# Patient Record
Sex: MALE | Race: WHITE | NOT HISPANIC OR LATINO | Employment: OTHER | URBAN - METROPOLITAN AREA
[De-identification: names, ages, dates, MRNs, and addresses within clinical notes are randomized per-mention and may not be internally consistent; named-entity substitution may affect disease eponyms.]

---

## 2018-03-07 ENCOUNTER — TRANSFERRED RECORDS (OUTPATIENT)
Dept: HEALTH INFORMATION MANAGEMENT | Facility: CLINIC | Age: 78
End: 2018-03-07

## 2020-10-20 ENCOUNTER — OFFICE VISIT (OUTPATIENT)
Dept: NEUROLOGY | Facility: CLINIC | Age: 80
End: 2020-10-20
Payer: COMMERCIAL

## 2020-10-20 VITALS
BODY MASS INDEX: 25.01 KG/M2 | HEART RATE: 66 BPM | DIASTOLIC BLOOD PRESSURE: 81 MMHG | HEIGHT: 68 IN | WEIGHT: 165 LBS | SYSTOLIC BLOOD PRESSURE: 134 MMHG

## 2020-10-20 DIAGNOSIS — G89.29 CHRONIC LEFT-SIDED LOW BACK PAIN WITH LEFT-SIDED SCIATICA: Primary | ICD-10-CM

## 2020-10-20 DIAGNOSIS — M54.42 CHRONIC LEFT-SIDED LOW BACK PAIN WITH LEFT-SIDED SCIATICA: Primary | ICD-10-CM

## 2020-10-20 DIAGNOSIS — R20.2 PARESTHESIAS IN RIGHT HAND: ICD-10-CM

## 2020-10-20 PROCEDURE — 99204 OFFICE O/P NEW MOD 45 MIN: CPT | Performed by: PSYCHIATRY & NEUROLOGY

## 2020-10-20 RX ORDER — ACETAMINOPHEN 500 MG
500-1000 TABLET ORAL EVERY 6 HOURS PRN
COMMUNITY

## 2020-10-20 RX ORDER — IBUPROFEN 200 MG
200 TABLET ORAL EVERY 4 HOURS PRN
COMMUNITY

## 2020-10-20 SDOH — HEALTH STABILITY: MENTAL HEALTH: HOW OFTEN DO YOU HAVE 6 OR MORE DRINKS ON ONE OCCASION?: NOT ASKED

## 2020-10-20 SDOH — HEALTH STABILITY: MENTAL HEALTH: HOW MANY STANDARD DRINKS CONTAINING ALCOHOL DO YOU HAVE ON A TYPICAL DAY?: NOT ASKED

## 2020-10-20 SDOH — HEALTH STABILITY: MENTAL HEALTH: HOW OFTEN DO YOU HAVE A DRINK CONTAINING ALCOHOL?: 4 OR MORE TIMES A WEEK

## 2020-10-20 ASSESSMENT — MIFFLIN-ST. JEOR: SCORE: 1422.94

## 2020-10-20 NOTE — LETTER
10/20/2020         RE: Rayray Avery  [address] [facility] [address]        Dear Colleague,    Thank you for referring your patient, Rayray Avery, to the [facility] [place]. Please see a copy of my visit note below.        NEUROLOGY NOTE        Assessment/Plan        Right upper extremity paresthesia for about 2 years mainly supine position.  Examination unremarkable.    Left lower back chronic pain radiating to the lateral side of the thigh down to about the knee level.  Severe degree 10 out of 10 with initial ambulation.  Does get little better after 1 miles walking and will come back again.  Supine and sitting position are fine.  There are some focal tenderness in the piriformis area.  Range of motion adequate.  Failed injection and physical therapy.  Has been going on for 2 years.  Last injection September 2020 no benefit.    Plan:  MRI of lumbar spine  EMG of right upper and left lower extremity.  Family will get previous MRI and the clinic notes to me for review.         SUBJECTIVE       Rayray Avery is a 80 year old male seen for chronic back pain..      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  Severe pain 10 out of 10 with initiation of's standing or walking.  it gets down to 2-3 in degree if he continues to walk for more than a mile.  Then the pain may come back again.  Sitting down or laying seem to be fine.  No incontinence.  No numbness or weakness in lower extremities.    Taking Tylenol and ibuprofen intermittently.  The pain is every days and every time he stands up and walking.  Had several injections.  One of the injection gave him 1 months of symptom relief.  Last injection was September without benefit.  Has had physical therapy did not benefit.    MRI study in December 2019.  Was told to have some degenerative changes.    Also  "had x-ray of the hip as well as the spine.  Report is not available for me to review.    Right hand numbness if he is laying on the back.  No weakness.  It has been going on for about 1 to 2 years.    No other known injuries.             Review of system     10 point system review otherwise unremarkable    PHYSICAL EXAMINATION     Vital signs in last 24 hours:  Vitals:    10/20/20 1112   BP: 134/81   Pulse: 66   Weight: 74.8 kg (165 lb)   Height: 1.727 m (5' 8\")       No acute distress sitting in chair.  Very pleasant.  Well-dressed and groomed.  Atraumatic normocephalic.  Range of motion in the arms and legs normal.  No pain with range of motion.  Exam of the head, neck, eyes, ears, nose and mouth negative.  No edema or skin rashes.  No JVD and breathing difficulty.  Normal mental status, language and speech.  Cranial nerve II through XII intact.  Adequate muscle bulk, tone and strength in 4 extremities.  No focal sensory difficulty.  Deep tendon reflexes 2+ symmetrical in the ankles and knees.  No Babinski signs.  Hand coordination adequate.  Little slow limping gait on the left.  Slightly stooped body posture.  No tremors.  No bradycardia or hypokinesia.      Problem List     Chronic lower back pain    Past medical history     Chronic lower back pain      Family history     Family History   Problem Relation Age of Onset     Cancer Mother          Social history     Social History     Socioeconomic History     Marital status:      Spouse name: Not on file     Number of children: Not on file     Years of education: Not on file     Highest education level: Not on file   Occupational History     Not on file   Social Needs     Financial resource strain: Not on file     Food insecurity     Worry: Not on file     Inability: Not on file     Transportation needs     Medical: Not on file     Non-medical: Not on file   Tobacco Use     Smoking status: Former Smoker     Smokeless tobacco: Never Used   Substance and " Sexual Activity     Alcohol use: Yes     Alcohol/week: 1.0 standard drinks     Types: 1 Glasses of wine per week     Frequency: 4 or more times a week     Drug use: Not on file     Sexual activity: Not on file   Lifestyle     Physical activity     Days per week: Not on file     Minutes per session: Not on file     Stress: Not on file   Relationships     Social connections     Talks on phone: Not on file     Gets together: Not on file     Attends [employer] service: Not on file     Active member of club or organization: Not on file     Attends meetings of clubs or organizations: Not on file     Relationship status: Not on file     Intimate partner violence     Fear of current or ex partner: Not on file     Emotionally abused: Not on file     Physically abused: Not on file     Forced sexual activity: Not on file   Other Topics Concern     Parent/sibling w/ CABG, MI or angioplasty before 65F 55M? Not Asked   Social History Narrative     Not on file         Allergy     Patient has no known allergies.    MEDICATIONS List     Current Outpatient Medications   Medication Sig Dispense Refill     acetaminophen (TYLENOL) 500 MG tablet Take 500-1,000 mg by mouth every 6 hours as needed for mild pain       ibuprofen (ADVIL/MOTRIN) 200 MG tablet Take 200 mg by mouth every 4 hours as needed for mild pain                     Ching Frederick MD, MD, PhD  Neurology   Office tel: [phone]          Again, thank you for allowing me to participate in the care of your patient.        Sincerely,        Ching Frederick MD

## 2020-10-20 NOTE — PROGRESS NOTES
NEUROLOGY NOTE        Assessment/Plan        Right upper extremity paresthesia for about 2 years mainly supine position.  Examination unremarkable.    Left lower back chronic pain radiating to the lateral side of the thigh down to about the knee level.  Severe degree 10 out of 10 with initial ambulation.  Does get little better after 1 miles walking and will come back again.  Supine and sitting position are fine.  There are some focal tenderness in the piriformis area.  Range of motion adequate.  Failed injection and physical therapy.  Has been going on for 2 years.  Last injection September 2020 no benefit.    Plan:  MRI of lumbar spine  EMG of right upper and left lower extremity.  Family will get previous MRI and the clinic notes to me for review.         SUBJECTIVE       Rayray Avery is a 80 year old male seen for chronic back pain..      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  Severe pain 10 out of 10 with initiation of's standing or walking.  it gets down to 2-3 in degree if he continues to walk for more than a mile.  Then the pain may come back again.  Sitting down or laying seem to be fine.  No incontinence.  No numbness or weakness in lower extremities.    Taking Tylenol and ibuprofen intermittently.  The pain is every days and every time he stands up and walking.  Had several injections.  One of the injection gave him 1 months of symptom relief.  Last injection was September without benefit.  Has had physical therapy did not benefit.    MRI study in December 2019.  Was told to have some degenerative changes.    Also had x-ray of the hip as well as the spine.  Report is not available for me to review.    Right hand numbness if he is laying on the back.  No weakness.  It has been going on for about 1 to 2 years.    No other known injuries.             Review of system     10 point  "system review otherwise unremarkable    PHYSICAL EXAMINATION     Vital signs in last 24 hours:  Vitals:    10/20/20 1112   BP: 134/81   Pulse: 66   Weight: 74.8 kg (165 lb)   Height: 1.727 m (5' 8\")       No acute distress sitting in chair.  Very pleasant.  Well-dressed and groomed.  Atraumatic normocephalic.  Range of motion in the arms and legs normal.  No pain with range of motion.  Exam of the head, neck, eyes, ears, nose and mouth negative.  No edema or skin rashes.  No JVD and breathing difficulty.  Normal mental status, language and speech.  Cranial nerve II through XII intact.  Adequate muscle bulk, tone and strength in 4 extremities.  No focal sensory difficulty.  Deep tendon reflexes 2+ symmetrical in the ankles and knees.  No Babinski signs.  Hand coordination adequate.  Little slow limping gait on the left.  Slightly stooped body posture.  No tremors.  No bradycardia or hypokinesia.      Problem List     Chronic lower back pain    Past medical history     Chronic lower back pain      Family history     Family History   Problem Relation Age of Onset     Cancer Mother          Social history     Social History     Socioeconomic History     Marital status:      Spouse name: Not on file     Number of children: Not on file     Years of education: Not on file     Highest education level: Not on file   Occupational History     Not on file   Social Needs     Financial resource strain: Not on file     Food insecurity     Worry: Not on file     Inability: Not on file     Transportation needs     Medical: Not on file     Non-medical: Not on file   Tobacco Use     Smoking status: Former Smoker     Smokeless tobacco: Never Used   Substance and Sexual Activity     Alcohol use: Yes     Alcohol/week: 1.0 standard drinks     Types: 1 Glasses of wine per week     Frequency: 4 or more times a week     Drug use: Not on file     Sexual activity: Not on file   Lifestyle     Physical activity     Days per week: Not on " file     Minutes per session: Not on file     Stress: Not on file   Relationships     Social connections     Talks on phone: Not on file     Gets together: Not on file     Attends Oriental orthodox service: Not on file     Active member of club or organization: Not on file     Attends meetings of clubs or organizations: Not on file     Relationship status: Not on file     Intimate partner violence     Fear of current or ex partner: Not on file     Emotionally abused: Not on file     Physically abused: Not on file     Forced sexual activity: Not on file   Other Topics Concern     Parent/sibling w/ CABG, MI or angioplasty before 65F 55M? Not Asked   Social History Narrative     Not on file         Allergy     Patient has no known allergies.    MEDICATIONS List     Current Outpatient Medications   Medication Sig Dispense Refill     acetaminophen (TYLENOL) 500 MG tablet Take 500-1,000 mg by mouth every 6 hours as needed for mild pain       ibuprofen (ADVIL/MOTRIN) 200 MG tablet Take 200 mg by mouth every 4 hours as needed for mild pain                     Ching Frederick MD, MD, PhD  Neurology   Office tel: [phone]

## 2020-10-22 ENCOUNTER — OFFICE VISIT (OUTPATIENT)
Dept: NEUROLOGY | Facility: CLINIC | Age: 80
End: 2020-10-22
Attending: PSYCHIATRY & NEUROLOGY
Payer: COMMERCIAL

## 2020-10-22 VITALS
BODY MASS INDEX: 25.01 KG/M2 | HEIGHT: 68 IN | DIASTOLIC BLOOD PRESSURE: 70 MMHG | HEART RATE: 64 BPM | WEIGHT: 165 LBS | SYSTOLIC BLOOD PRESSURE: 133 MMHG

## 2020-10-22 DIAGNOSIS — R20.2 PARESTHESIAS IN RIGHT HAND: ICD-10-CM

## 2020-10-22 DIAGNOSIS — M54.42 CHRONIC LEFT-SIDED LOW BACK PAIN WITH LEFT-SIDED SCIATICA: ICD-10-CM

## 2020-10-22 DIAGNOSIS — H90.0 CONDUCTIVE HEARING LOSS, BILATERAL: ICD-10-CM

## 2020-10-22 DIAGNOSIS — G60.3 IDIOPATHIC PROGRESSIVE NEUROPATHY: Primary | ICD-10-CM

## 2020-10-22 DIAGNOSIS — R41.3 MEMORY DIFFICULTY: ICD-10-CM

## 2020-10-22 DIAGNOSIS — G89.29 CHRONIC LEFT-SIDED LOW BACK PAIN WITH LEFT-SIDED SCIATICA: ICD-10-CM

## 2020-10-22 PROCEDURE — 95913 NRV CNDJ TEST 13/> STUDIES: CPT | Performed by: PSYCHIATRY & NEUROLOGY

## 2020-10-22 PROCEDURE — 95886 MUSC TEST DONE W/N TEST COMP: CPT | Performed by: PSYCHIATRY & NEUROLOGY

## 2020-10-22 PROCEDURE — 99203 OFFICE O/P NEW LOW 30 MIN: CPT | Performed by: PSYCHIATRY & NEUROLOGY

## 2020-10-22 ASSESSMENT — MIFFLIN-ST. JEOR: SCORE: 1422.94

## 2020-10-22 NOTE — PROGRESS NOTES
NEUROLOGY NOTE        Assessment/Plan        Right upper extremity paresthesia for about 2 years mainly supine position.  Examination unremarkable.     Left lower back chronic pain radiating to the lateral side of the thigh down to about the knee level.  Severe degree 10 out of 10 with initial ambulation.  Does get little better after 1 miles walking and will come back again.  Supine and sitting position are fine.  There are some focal tenderness in the piriformis area.  Range of motion adequate.  Failed injection and physical therapy.  Has been going on for 2 years.  Last injection September 2020 no benefit.     Sensorimotor polyneuropathy of axonal type.  Confirmed on EMG EMG study 10/22/2020.  This may contribute to the walking difficulty on top of hard of hearing.    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  No hearing aids.    Some memory difficulty noted during visit today.    Plan:    MRI of lumbar spine pending    Family will get previous MRI and the clinic notes to me for review.  Have requested patient to sign release of information.    Follow-up in about 3 weeks to discuss test results for memory test.    Blood tests including B12, TSH, vitamin B1, serum immunofixation, CBC, BMP, and liver function    Plan memory test at next visit             SUBJECTIVE         Rayray Avery is a 80 year old male seen for chronic back pain..       Report about 2 years ago he had a fellow told him that he had polyneuropathy.  At that time he he may have had EMG study.  But he does not remember where he had the test done and the which Dr. Vasquez saw.  Did not know if he had any work-up.  Reports for the past 3 years also he has not seen any doctors.  But he did see a primary as last time discussion we had per his wife.  He had MRI of lower back but at this time we do not have the report yet.    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  Severe pain 10 out of 10 with initiation of's standing or walking.  it gets down to 2-3 in degree if he continues to walk for more than a mile.  Then the pain may come back again.  Sitting down or laying seem to be fine.  No incontinence.  No numbness or weakness in lower extremities.     Taking Tylenol and ibuprofen intermittently.  The pain is every days and every time he stands up and walking.  Had several injections.  One of the injection gave him 1 months of symptom relief.  Last injection was September without benefit.  Has had physical therapy did not benefit.     MRI study in December 2019.  Was told to have some degenerative changes.     Also had x-ray of the hip as well as the spine.  Report is not available for me to review.     Right hand numbness if he is laying on the back.  No weakness.  It has been going on for about 1 to 2 years.     No other known injuries.           Review of system     10 point system review otherwise unremarkable    PHYSICAL EXAMINATION     Vital signs in last 24 hours:  Please refer to documentation in record    No acute distress sitting in chair.  Very pleasant.  Well-dressed and groomed.  Atraumatic normocephalic.  Range of motion in the arms and legs normal.  No pain with range of motion.  Exam of the head, neck, eyes, ears, nose and mouth negative.  No edema or skin rashes.  No JVD and breathing difficulty.  Normal mental status, language and speech.  Cranial nerve II through XII intact.  Adequate muscle bulk, tone and strength in 4 extremities.  No focal sensory difficulty.  Deep tendon reflexes 2+ symmetrical in the ankles and knees.  No Babinski signs.  Hand coordination adequate.  Little slow limping gait on the left.  Slightly stooped body posture.  No tremors.  No bradycardia or hypokinesia.      Problem List     Patient Active Problem List    Diagnosis Date Noted     Chronic left-sided low back pain with left-sided sciatica 10/20/2020     Priority:  Medium     Paresthesias in right hand 10/20/2020     Priority: Medium         Past medical history     No past surgical history on file.    Past Medical History:   Diagnosis Date     Chronic left-sided low back pain with left-sided sciatica            Family history     Family History   Problem Relation Age of Onset     Cancer Mother          Social history     Social History     Socioeconomic History     Marital status:      Spouse name: Not on file     Number of children: Not on file     Years of education: Not on file     Highest education level: Not on file   Occupational History     Not on file   Social Needs     Financial resource strain: Not on file     Food insecurity     Worry: Not on file     Inability: Not on file     Transportation needs     Medical: Not on file     Non-medical: Not on file   Tobacco Use     Smoking status: Former Smoker     Smokeless tobacco: Never Used   Substance and Sexual Activity     Alcohol use: Yes     Alcohol/week: 1.0 standard drinks     Types: 1 Glasses of wine per week     Frequency: 4 or more times a week     Drug use: Not on file     Sexual activity: Not on file   Lifestyle     Physical activity     Days per week: Not on file     Minutes per session: Not on file     Stress: Not on file   Relationships     Social connections     Talks on phone: Not on file     Gets together: Not on file     Attends Zoroastrian service: Not on file     Active member of club or organization: Not on file     Attends meetings of clubs or organizations: Not on file     Relationship status: Not on file     Intimate partner violence     Fear of current or ex partner: Not on file     Emotionally abused: Not on file     Physically abused: Not on file     Forced sexual activity: Not on file   Other Topics Concern     Parent/sibling w/ CABG, MI or angioplasty before 65F 55M? Not Asked   Social History Narrative     Not on file         Allergy     Patient has no known allergies.    MEDICATIONS List      Current Outpatient Medications   Medication Sig Dispense Refill     acetaminophen (TYLENOL) 500 MG tablet Take 500-1,000 mg by mouth every 6 hours as needed for mild pain       ibuprofen (ADVIL/MOTRIN) 200 MG tablet Take 200 mg by mouth every 4 hours as needed for mild pain                   Ching Frederick MD, MD, PhD  Neurology   Office tel: [phone]

## 2020-10-22 NOTE — PROCEDURES
[facility]     (Formerly) [facility]  [address] [place] [address], [address]  [place] [address]  Tel: [phone]  Fax: [phone]          Full Name: Rayray Avery Gender: Male  Patient ID: [id number] YOB: 1940      Visit Date: 10/22/2020 10:51  Age: 80 Years 0 Months Old  Interpreted By: Ching Frederick MD  Height: 5 feet 8 inch      Motor NCS      Nerve / Sites Lat Amp Dist Marcelo    ms mV cm m/s   R Median - APB      Wrist, median site 4.43 3.6 7       Elbow 9.79 5.5 28 52      wrist, ulnar side 3.49 2.1     R Ulnar - ADM      Wrist 2.81 6.7 7       B.Elbow 6.98 5.1 21 50      A.Elbow 8.49 4.8 8 53   L Peroneal - EDB      Ankle 5.83 1.0 8       Fib head 14.01 0.9 32 39      Pop fossa 15.94 1.0 10 52   R Peroneal - EDB      Ankle NR NR 8    L Tibial - AH      Ankle 8.02 0.7 8       Pop fossa       R Tibial - AH      Ankle NR NR 8        Sensory NCS      Nerve / Sites Onset Lat Pk Lat Amp.2-3 Dist Lat Diff    ms ms  V cm ms   R Median, Ulnar - Transcarpal comparison      Median Palm 2.45 3.07 16.5 8       Ulnar Palm 3.18 4.69 22.7 8         -1.61   R Median - Orthodr (Dig II)      Dig II 3.44 4.17 5.3 13    R Ulnar - Orthodr, (Dig V)      Dig V 2.45 3.02 6.0 11    L Sural - Ankle (Calf)      Calf NR NR NR 14    R Sural - Ankle (Calf)      Calf NR NR NR 14    L Superficial peroneal - Ankle      Lat leg NR NR NR 12    R Superficial peroneal - Ankle      Lat leg NR NR NR 12                                Summary:    Motor conduction studies:  Right median motor response is normal  Right ulnar motor response is normal.  Left common peroneal motor responses had diminished amplitudes, and prolonged latencies.  Left tibial motor responses had diminished amplitude with distal stimulation, no response with proximal stimulations.  Right common peroneal motor response unobtainable  Left tibial motor responses unobtainable.    Sensory conduction studies:  Right  median palmar mixed response normal  Left ulnar palmar mixed response has slightly prolonged latencies.  Right median sensory response normal.  Right ulnar digital sensory response and obtainable.  Left sural sensory response unobtainable  Left superficial peroneal sensory response unobtainable  Right sural sensory response unobtainable  Superficial peroneal sensory response unobtainable.    Routine needle EMG:  There are 1 fibs in left tibialis anterior medial and lateral bilateral gastrocnemius muscles.  EMG of right biceps, triceps, EDC, FDI, and ABP was normal.  EMG of left quadriceps, tibialis posterior, short head of biceps, iliopsoas was normal.    Conclusions:  This is an abnormal EMG study.  There is electrophysiological evidence most suggestive of a sensorimotor polyneuropathy of axonal type.  No evidence of diffuse myopathy, or colopathy affecting bilateral lower or right upper extremity muscles.  Clinical correlation is recommended.

## 2020-10-22 NOTE — LETTER
10/22/2020         RE: Rayray Avery  [id number] [facility] [address]        Dear Colleague,    Thank you for referring your patient, Rayray Avery, to the [facility] [place]. Please see a copy of my visit note below.        NEUROLOGY NOTE        Assessment/Plan        Right upper extremity paresthesia for about 2 years mainly supine position.  Examination unremarkable.     Left lower back chronic pain radiating to the lateral side of the thigh down to about the knee level.  Severe degree 10 out of 10 with initial ambulation.  Does get little better after 1 miles walking and will come back again.  Supine and sitting position are fine.  There are some focal tenderness in the piriformis area.  Range of motion adequate.  Failed injection and physical therapy.  Has been going on for 2 years.  Last injection September 2020 no benefit.     Some memory difficulty noted during visit today.    Plan:    MRI of lumbar spine pending    Family will get previous MRI and the clinic notes to me for review.  Have requested patient to sign release of information.    Follow-up in about 3 weeks to discuss test results for memory test.    Blood tests including B12, TSH, vitamin B1, serum immunofixation, CBC, BMP, and liver function    Plan memory test at next visit             SUBJECTIVE         Rayray Avery is a 80 year old male seen for chronic back pain..       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  Severe pain 10 out of 10 with initiation of's standing or walking.  it gets down to 2-3 in degree if he continues to walk for more than a mile.  Then the pain may come back again.  Sitting down or laying seem to be fine.  No incontinence.  No numbness or weakness in lower extremities.     Taking Tylenol and ibuprofen intermittently.  The pain is every days and every  time he stands up and walking.  Had several injections.  One of the injection gave him 1 months of symptom relief.  Last injection was September without benefit.  Has had physical therapy did not benefit.     MRI study in December 2019.  Was told to have some degenerative changes.     Also had x-ray of the hip as well as the spine.  Report is not available for me to review.     Right hand numbness if he is laying on the back.  No weakness.  It has been going on for about 1 to 2 years.     No other known injuries.           Review of system     10 point system review otherwise unremarkable    PHYSICAL EXAMINATION     Vital signs in last 24 hours:  Please refer to documentation in record    No acute distress sitting in chair.  Very pleasant.  Well-dressed and groomed.  Atraumatic normocephalic.  Range of motion in the arms and legs normal.  No pain with range of motion.  Exam of the head, neck, eyes, ears, nose and mouth negative.  No edema or skin rashes.  No JVD and breathing difficulty.  Normal mental status, language and speech.  Cranial nerve II through XII intact.  Adequate muscle bulk, tone and strength in 4 extremities.  No focal sensory difficulty.  Deep tendon reflexes 2+ symmetrical in the ankles and knees.  No Babinski signs.  Hand coordination adequate.  Little slow limping gait on the left.  Slightly stooped body posture.  No tremors.  No bradycardia or hypokinesia.      Problem List     Patient Active Problem List    Diagnosis Date Noted     Chronic left-sided low back pain with left-sided sciatica 10/20/2020     Priority: Medium     Paresthesias in right hand 10/20/2020     Priority: Medium         Past medical history     No past surgical history on file.    Past Medical History:   Diagnosis Date     Chronic left-sided low back pain with left-sided sciatica            Family history     Family History   Problem Relation Age of Onset     Cancer Mother          Social history     Social History      Socioeconomic History     Marital status:      Spouse name: Not on file     Number of children: Not on file     Years of education: Not on file     Highest education level: Not on file   Occupational History     Not on file   Social Needs     Financial resource strain: Not on file     Food insecurity     Worry: Not on file     Inability: Not on file     Transportation needs     Medical: Not on file     Non-medical: Not on file   Tobacco Use     Smoking status: Former Smoker     Smokeless tobacco: Never Used   Substance and Sexual Activity     Alcohol use: Yes     Alcohol/week: 1.0 standard drinks     Types: 1 Glasses of wine per week     Frequency: 4 or more times a week     Drug use: Not on file     Sexual activity: Not on file   Lifestyle     Physical activity     Days per week: Not on file     Minutes per session: Not on file     Stress: Not on file   Relationships     Social connections     Talks on phone: Not on file     Gets together: Not on file     Attends Protestant service: Not on file     Active member of club or organization: Not on file     Attends meetings of clubs or organizations: Not on file     Relationship status: Not on file     Intimate partner violence     Fear of current or ex partner: Not on file     Emotionally abused: Not on file     Physically abused: Not on file     Forced sexual activity: Not on file   Other Topics Concern     Parent/sibling w/ CABG, MI or angioplasty before 65F 55M? Not Asked   Social History Narrative     Not on file         Allergy     Patient has no known allergies.    MEDICATIONS List     Current Outpatient Medications   Medication Sig Dispense Refill     acetaminophen (TYLENOL) 500 MG tablet Take 500-1,000 mg by mouth every 6 hours as needed for mild pain       ibuprofen (ADVIL/MOTRIN) 200 MG tablet Take 200 mg by mouth every 4 hours as needed for mild pain                   Ching Frederick MD, MD, PhD  Neurology   Office tel: [phone]          Again, thank  you for allowing me to participate in the care of your patient.        Sincerely,        Ching Frederick MD     details… detailed exam mouth

## 2020-11-05 ENCOUNTER — HOSPITAL ENCOUNTER (OUTPATIENT)
Dept: LAB | Facility: CLINIC | Age: 80
End: 2020-11-05
Attending: PSYCHIATRY & NEUROLOGY
Payer: COMMERCIAL

## 2020-11-05 ENCOUNTER — TELEPHONE (OUTPATIENT)
Dept: NEUROLOGY | Facility: CLINIC | Age: 80
End: 2020-11-05

## 2020-11-05 ENCOUNTER — HOSPITAL ENCOUNTER (OUTPATIENT)
Dept: MRI IMAGING | Facility: CLINIC | Age: 80
End: 2020-11-05
Attending: PSYCHIATRY & NEUROLOGY
Payer: COMMERCIAL

## 2020-11-05 DIAGNOSIS — G60.3 IDIOPATHIC PROGRESSIVE NEUROPATHY: ICD-10-CM

## 2020-11-05 DIAGNOSIS — R41.3 MEMORY DIFFICULTY: ICD-10-CM

## 2020-11-05 LAB
ALBUMIN SERPL-MCNC: 3.9 G/DL (ref 3.4–5)
ALP SERPL-CCNC: 48 U/L (ref 40–150)
ALT SERPL W P-5'-P-CCNC: 29 U/L (ref 0–70)
ANION GAP SERPL CALCULATED.3IONS-SCNC: 9 MMOL/L (ref 3–14)
AST SERPL W P-5'-P-CCNC: 18 U/L (ref 0–45)
BILIRUB DIRECT SERPL-MCNC: 0.2 MG/DL (ref 0–0.2)
BILIRUB SERPL-MCNC: 1 MG/DL (ref 0.2–1.3)
BUN SERPL-MCNC: 18 MG/DL (ref 7–30)
CALCIUM SERPL-MCNC: 8.9 MG/DL (ref 8.5–10.1)
CHLORIDE SERPL-SCNC: 109 MMOL/L (ref 94–109)
CO2 SERPL-SCNC: 23 MMOL/L (ref 20–32)
CREAT SERPL-MCNC: 0.87 MG/DL (ref 0.66–1.25)
ERYTHROCYTE [DISTWIDTH] IN BLOOD BY AUTOMATED COUNT: 13.2 % (ref 10–15)
GFR SERPL CREATININE-BSD FRML MDRD: 80 ML/MIN/{1.73_M2}
GLUCOSE SERPL-MCNC: 98 MG/DL (ref 70–99)
HCT VFR BLD AUTO: 46.2 % (ref 40–53)
HGB BLD-MCNC: 15.1 G/DL (ref 13.3–17.7)
MCH RBC QN AUTO: 30.9 PG (ref 26.5–33)
MCHC RBC AUTO-ENTMCNC: 32.7 G/DL (ref 31.5–36.5)
MCV RBC AUTO: 95 FL (ref 78–100)
PLATELET # BLD AUTO: 154 10E9/L (ref 150–450)
POTASSIUM SERPL-SCNC: 4 MMOL/L (ref 3.4–5.3)
PROT SERPL-MCNC: 7.1 G/DL (ref 6.8–8.8)
RBC # BLD AUTO: 4.88 10E12/L (ref 4.4–5.9)
SODIUM SERPL-SCNC: 141 MMOL/L (ref 133–144)
TSH SERPL DL<=0.005 MIU/L-ACNC: 0.91 MU/L (ref 0.4–4)
VIT B12 SERPL-MCNC: 448 PG/ML (ref 193–986)
WBC # BLD AUTO: 5.8 10E9/L (ref 4–11)

## 2020-11-05 PROCEDURE — 36415 COLL VENOUS BLD VENIPUNCTURE: CPT | Performed by: PSYCHIATRY & NEUROLOGY

## 2020-11-05 PROCEDURE — 84443 ASSAY THYROID STIM HORMONE: CPT | Performed by: PSYCHIATRY & NEUROLOGY

## 2020-11-05 PROCEDURE — 80048 BASIC METABOLIC PNL TOTAL CA: CPT | Performed by: PSYCHIATRY & NEUROLOGY

## 2020-11-05 PROCEDURE — 82784 ASSAY IGA/IGD/IGG/IGM EACH: CPT | Performed by: PSYCHIATRY & NEUROLOGY

## 2020-11-05 PROCEDURE — 82607 VITAMIN B-12: CPT | Performed by: PSYCHIATRY & NEUROLOGY

## 2020-11-05 PROCEDURE — 72148 MRI LUMBAR SPINE W/O DYE: CPT

## 2020-11-05 PROCEDURE — 80076 HEPATIC FUNCTION PANEL: CPT | Performed by: PSYCHIATRY & NEUROLOGY

## 2020-11-05 PROCEDURE — 84425 ASSAY OF VITAMIN B-1: CPT | Performed by: PSYCHIATRY & NEUROLOGY

## 2020-11-05 PROCEDURE — 86334 IMMUNOFIX E-PHORESIS SERUM: CPT | Mod: 26 | Performed by: PATHOLOGY

## 2020-11-05 PROCEDURE — 85027 COMPLETE CBC AUTOMATED: CPT | Performed by: PSYCHIATRY & NEUROLOGY

## 2020-11-05 PROCEDURE — 86334 IMMUNOFIX E-PHORESIS SERUM: CPT | Mod: TC | Performed by: PSYCHIATRY & NEUROLOGY

## 2020-11-05 NOTE — TELEPHONE ENCOUNTER
Pt left msg yesterday complaining about pain on the right side of his back. He states he can't move/get up. He's requesting a call back at [phone].

## 2020-11-05 NOTE — TELEPHONE ENCOUNTER
Spoke to Rayray and he is doing ok, his pain moved from the left side to the right side. He just got done with the MRI and labs. I let him know when we get the results we will call him.  Nevaeh Dominguez, [place] on 11/5/2020 at 1:47 PM

## 2020-11-06 LAB
IGA SERPL-MCNC: 122 MG/DL (ref 84–499)
IGG SERPL-MCNC: 632 MG/DL (ref 610–1616)
IGM SERPL-MCNC: 38 MG/DL (ref 35–242)
PROT PATTERN SERPL IFE-IMP: NORMAL

## 2020-11-08 LAB — VIT B1 BLD-MCNC: 158 NMOL/L (ref 70–180)

## 2020-11-13 ENCOUNTER — TELEPHONE (OUTPATIENT)
Dept: NEUROLOGY | Facility: CLINIC | Age: 80
End: 2020-11-13

## 2020-11-23 ENCOUNTER — TELEPHONE (OUTPATIENT)
Dept: NEUROLOGY | Facility: CLINIC | Age: 80
End: 2020-11-23

## 2020-11-23 NOTE — TELEPHONE ENCOUNTER
Please arrange to be seen in person or virtual to discuss test results. Ok to add on within 1 week.

## 2021-01-15 NOTE — TELEPHONE ENCOUNTER
Pt called to ask for MRI results. He also wanted the report mailed to him, which I did. He also asked if Dr Rivera's records were in his chart, and they are.   Call pt at [phone] tomorrow. Ok to inge.   [Follow-Up: _____] : a [unfilled] follow-up visit [FreeTextEntry1] : History of colon cancer, history of colon polyps

## 2022-01-28 ENCOUNTER — TELEPHONE (OUTPATIENT)
Dept: NEUROLOGY | Facility: CLINIC | Age: 82
End: 2022-01-28
Payer: COMMERCIAL

## 2022-01-28 NOTE — TELEPHONE ENCOUNTER
"[facility] Call Center    Phone Message    May a detailed message be left on voicemail: yes     Reason for Call: Other: pt is quite frustrated. pt reporting that he was seen by Dr. Ching Frederick at the [facility] Neurology location two years ago. Pt reporting that he wants to pay the bill for the testing that this provider ordered. However, pt does not want to pay for the neuropathy testing until he gets the test results. Pt reporting that he has been trying to get his results for two years. Writer sees that pt was sent a msg by Dr. Frederick on 11/23/20 letting pt know that he can schedule an in person or virtual appt to go over the test results.  Pt did not easily track the conversation update from writer during this conversation. Pt continued to repeat his same information, over and over again---\"that he wants to pay his bill for the neuropathy testing, but that he needs the results, first.\"  Writer did let the pt know that Dr. Ching Frederick is no longer at our clinic.     Please review and call pt back to have this conversation. Thank you.    Action Taken: Message routed to:  Other: [facility]    Travel Screening: Not Applicable                                                                      "

## 2022-01-28 NOTE — TELEPHONE ENCOUNTER
Dr. Meneses- Can you please review patients MRI and lab work that Dr. Frederick had ordered? Patient may have [employer] so he would not be able to come back here but I would like to give him some direction. Patient was asked to schedule follow up several times Nov 2020 and appears as if this didn't happen  Labs appear WNL  MRI L-Spine   IMPRESSION:    1. 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  2. 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  3. Other degenerative change as detailed.  Caren Montes De Oca CMA on 1/28/2022 at 3:12 PM

## 2022-01-31 NOTE — TELEPHONE ENCOUNTER
MRI lumbar spine shows disc herniation at L3-L4 with severe spinal canal stenosis.      Previously EMG suggested advanced polyneuropathy.  Lab work for common causes of neuropathy included normal vitamin B1, B12, TSH comprehensive metabolic panel.      He needs to see neurosurgery for severe L3-4 spinal stenosis

## 2022-01-31 NOTE — TELEPHONE ENCOUNTER
Had a lengthy conversation with Kevin regarding his MRI L-Spine  He is down in [place] right now and won't be back until May. He has [employer] for insurance so can't come back to [facility]. His son is a radiologist at [place] and will discuss with him. Suggested he avoid strenuous exercise until he can speak with a neurosurgeon and discuss next steps. He does complain of back pain but states that stretching has helped him but pain comes back. I did e-mail him the MRI report from 10/2020 and also a MICHELLE for wherever he decides to go for his care. Encouraged him to not wait until May  Caren Montes De Oca CMA on 1/31/2022 at 3:02 PM